# Patient Record
Sex: FEMALE | Race: WHITE | Employment: OTHER | URBAN - METROPOLITAN AREA
[De-identification: names, ages, dates, MRNs, and addresses within clinical notes are randomized per-mention and may not be internally consistent; named-entity substitution may affect disease eponyms.]

---

## 2018-09-20 ENCOUNTER — OFFICE VISIT (OUTPATIENT)
Dept: CARDIOLOGY CLINIC | Age: 68
End: 2018-09-20

## 2018-09-20 ENCOUNTER — CLINICAL SUPPORT (OUTPATIENT)
Dept: CARDIOLOGY CLINIC | Age: 68
End: 2018-09-20

## 2018-09-20 VITALS
HEIGHT: 64 IN | SYSTOLIC BLOOD PRESSURE: 140 MMHG | OXYGEN SATURATION: 97 % | BODY MASS INDEX: 27.14 KG/M2 | RESPIRATION RATE: 16 BRPM | WEIGHT: 159 LBS | HEART RATE: 66 BPM | DIASTOLIC BLOOD PRESSURE: 80 MMHG

## 2018-09-20 DIAGNOSIS — I35.1 NONRHEUMATIC AORTIC VALVE INSUFFICIENCY: ICD-10-CM

## 2018-09-20 DIAGNOSIS — I49.9 IRREGULAR HEART BEAT: ICD-10-CM

## 2018-09-20 DIAGNOSIS — R00.2 PALPITATIONS: ICD-10-CM

## 2018-09-20 DIAGNOSIS — I10 ESSENTIAL HYPERTENSION: ICD-10-CM

## 2018-09-20 DIAGNOSIS — I35.0 NONRHEUMATIC AORTIC VALVE STENOSIS: Primary | ICD-10-CM

## 2018-09-20 RX ORDER — LISINOPRIL 30 MG/1
30 TABLET ORAL DAILY
COMMUNITY

## 2018-09-20 RX ORDER — FLAXSEED OIL 1000 MG
1 CAPSULE ORAL DAILY
COMMUNITY

## 2018-09-20 RX ORDER — BISMUTH SUBSALICYLATE 262 MG
1 TABLET,CHEWABLE ORAL DAILY
COMMUNITY

## 2018-09-20 RX ORDER — ASPIRIN 81 MG/1
TABLET ORAL DAILY
COMMUNITY
End: 2019-10-03 | Stop reason: ALTCHOICE

## 2018-09-20 RX ORDER — ASCORBIC ACID 500 MG
500 TABLET ORAL DAILY
COMMUNITY

## 2018-09-20 RX ORDER — CHOLECALCIFEROL (VITAMIN D3) 125 MCG
1 CAPSULE ORAL DAILY
COMMUNITY

## 2018-09-20 NOTE — PROGRESS NOTES
[facility]  [phone]    Reason for consult: aortic stenosis  Requesting physician: Dr. Jayna Reece     HPI: Kale Palma, a 80y.o. year-old who presents for evaluation of aortic stenosis. She has a bicuspid aortic valve and recent TTE in  showed mild to mod AI and mild AS  She was asked to come for an evaluation  Discussed bicuspid vavle disease, progression, concerns, treatments, At this time her disease is mild. She has been getting regular TTEs ordered by her PCP   She denies any chest pain   No dyspnea with exertion or PND  No orthopnea   No LE edema  No dizziness or syncope  No activity intolerance  Feeling a lot of skipped beats, reduced caffeine and it has helped a little but still has episodes every 5-10 minutes  Used to drink one cup of coffee daily but now drinks decaffeinated coffee  Trying to avoid chocolate kisses, dark chocolate candy bars  No high risk symptoms occur with her palpitations  Sleeps 8 hours/day   Stress level is low   Stress test ok     Her  is a patient of mine - Janalee Meigs    Assessment/Plan:  1. Mild AS, mild to mod AI by TTE  - will plan to repeat TTE in 1 year at her follow up visit to reassess, discussed treatment options for severe AS if intervention needed in the future, advised her not to lift more than 100 lbs  2. HTN - well controlled on lisinopril  3. Vitamin D - on supplementation   4. PUD - not currently on therapy  5.   Palpitations - will order 2 week loop monitor to assess for arrhythmias, BMP and TFTs in  ok     Echo  at [facility] - LVEF 65%, grade 1 dd, biscuspid aortic valve with mild to mod AI and mild AS (RAJEEV 1.6cm2, 16 peak and 8 mean gradient), trace MR, trace TR    Fam Hx: father passed from cancer, mother passed from Alzheimer's, brother passed from brain aneurysm from HTN around age 61  -niece  at age 36 from aortic aneurysm   Soc Hx: no tobacco use, no etoh use, no drug use     She has a past medical history of Essential hypertension; Murmur; and Valvular heart disease. Cardiovascular ROS: no chest pain or dyspnea on exertion, positive for palpitations  Respiratory ROS: no cough, shortness of breath, or wheezing  Neurological ROS: no TIA or stroke symptoms  All other systems negative except as above. PE  Vitals:    09/20/18 1025   BP: 140/80   Pulse: 66   Resp: 16   SpO2: 97%   Weight: 159 lb (72.1 kg)   Height: 5' 4\" (1.626 m)    Body mass index is 27.29 kg/(m^2).    General appearance - alert, well appearing, and in no distress  Mental status - affect appropriate to mood  Eyes - sclera anicteric, moist mucous membranes  Neck - supple, bilateral carotid bruits   Lymphatics - not assessed  Chest - clear to auscultation, no wheezes, rales or rhonchi  Heart - normal rate, regular rhythm, normal S1, S2, 2/6 KAMLA which radiates to the carotids   Abdomen - soft, nontender, nondistended, no masses or organomegaly  Back exam - full range of motion, no tenderness  Neurological - cranial nerves II through XII grossly intact, no focal deficit  Musculoskeletal - no muscular tenderness noted, normal strength  Extremities - peripheral pulses normal, no pedal edema  Skin - normal coloration  no rashes    12 lead ECG: NSR with non-specific ST-T wave changes    Recent Labs:  No results found for: CHOL, CHOLX, CHLST, CHOLV, 693944, HDL, LDL, LDLC, DLDLP, TGLX, TRIGL, TRIGP, CHHD, CHHDX  No results found for: KRYSTEN, ACREA, CREA, REFC3, REFC4  No results found for: BUN, IBUN, MBUNV, BUNV  No results found for: K, KI, PLK, WBK  No results found for: HBA1C, HGBE8, [id number], DWS0MRXS  No results found for: HGB, HGBP, HGBEXT, HGBEXT  No results found for: PLT, PLTEXT, PLTEXT    Reviewed:  Past Medical History:   Diagnosis Date    Essential hypertension     Murmur     Valvular heart disease      History   Smoking Status    Never Smoker   Smokeless Tobacco    Never Used     History   Alcohol Use No Allergies   Allergen Reactions    Sulfacetamide Swelling       Current Outpatient Prescriptions   Medication Sig    lisinopril (PRINIVIL, ZESTRIL) 20 mg tablet Take  by mouth daily.  aspirin delayed-release 81 mg tablet Take  by mouth daily.  cholecalciferol, vitamin D3, (VITAMIN D3) 2,000 unit tab Take 1 Tab by mouth daily.  CALCIUM-MAGNESIUM PO Take 1 Tab by mouth daily.  ascorbic acid, vitamin C, (VITAMIN C) 500 mg tablet Take 500 mg by mouth daily.  flaxseed oil 1,000 mg cap Take 1 Cap by mouth daily.  multivitamin (ONE A DAY) tablet Take 1 Tab by mouth daily. No current facility-administered medications for this visit.         Elaine Maciel MD  [facility] [address] [facility] [address] [facility] [address]  [place] Holguin  [phone]

## 2018-09-20 NOTE — MR AVS SNAPSHOT
[address] [facility] [address] [place] 
[phone] Patient: Allyssa Kelley MRN: [id number] TTZ:[birth year] Visit Information Date & Time Provider Department Dept. Phone Encounter #  
 9/20/2018 10:40 AM Aditya Vega MD [facility] [phone] [id number] Your Appointments 9/20/2019  9:00 AM  
ECHO CARDIOGRAMS 2D with ECHO, [facility] ([facility]ERT[facility] SCHEDULING) Appt Note: echo for AS and 1 yr f/u after  
 [address] [facility] [address] [facility] [address]  
[phone]  
  
   
 [address] [place] [address] Dr [place]  
  
    
 9/20/2019  9:40 AM  
ESTABLISHED PATIENT with Aditya Vega MD  
[facility] Appt Note: echo for AS and 1 yr f/u after  
 [address] [facility] [address] [place]  
[address] [facility] [address] [id number] Upcoming Health Maintenance Date Due Hepatitis C Screening 1950 DTaP/Tdap/Td series (1 - Tdap) 1/12/1972 BREAST CANCER SCRN MAMMOGRAM 1/12/2001 FOBT Q 1 YEAR AGE 50-75 1/12/2001 ZOSTER VACCINE AGE 60> 11/12/2010 GLAUCOMA SCREENING Q2Y 1/12/2016 Bone Densitometry (Dexa) Screening 1/12/2016 Pneumococcal 65+ Low/Medium Risk (1 of 2 - PCV13) 1/12/2016 Influenza Age 5 to Adult 8/1/2018 Allergies as of 9/20/2018  Review Complete On: 9/20/2018 By: Nabila Morales Severity Noted Reaction Type Reactions Sulfacetamide  09/20/2018    Swelling Current Immunizations  Never Reviewed No immunizations on file. Not reviewed this visit You Were Diagnosed With   
  
 Codes Comments Nonrheumatic aortic valve stenosis    -  Primary ICD-10-CM: I35.0 ICD-9-CM: 424.1 Irregular heart beat     ICD-10-CM: I49.9 ICD-9-CM: 427.9 Palpitations     ICD-10-CM: R00.2 ICD-9-CM: 785.1 Nonrheumatic aortic valve insufficiency     ICD-10-CM: I35.1 ICD-9-CM: 424.1 Essential hypertension     ICD-10-CM: I10 
ICD-9-CM: 401.9 Vitals BP Pulse Resp Height(growth percentile) Weight(growth percentile) SpO2  
 140/80 (BP 1 Location: Left arm, BP Patient Position: Sitting) 66 16 5' 4\" (1.626 m) 159 lb (72.1 kg) 97% BMI Smoking Status 27.29 kg/m2 Never Smoker Vitals History BMI and BSA Data Body Mass Index Body Surface Area  
 27.29 kg/m 2 1.8 m 2 Your Updated Medication List  
  
   
This list is accurate as of 9/20/18 11:47 AM.  Always use your most recent med list.  
  
  
  
  
 aspirin delayed-release 81 mg tablet Take  by mouth daily. CALCIUM-MAGNESIUM PO Take 1 Tab by mouth daily. flaxseed oil 1,000 mg Cap Take 1 Cap by mouth daily. lisinopril 20 mg tablet Commonly known as:  Sánchez Shutters Take  by mouth daily. multivitamin tablet Commonly known as:  ONE A DAY Take 1 Tab by mouth daily. VITAMIN C 500 mg tablet Generic drug:  ascorbic acid (vitamin C) Take 500 mg by mouth daily. VITAMIN D3 2,000 unit Tab Generic drug:  cholecalciferol (vitamin D3) Take 1 Tab by mouth daily. We Performed the Following AMB POC EKG ROUTINE W/ 12 LEADS, INTER & REP [05617 CPT(R)] To-Do List   
 09/20/2018 Cardiac Services:  LOOP MONITOR Patient Instructions Please do not lift more than 100 lbs You have no other exercise restrictions Introducing [facility]! Clarisse Holbrook introduces [employer] patient portal. Now you can access parts of your medical record, email your doctor's office, and request medication refills online. 1. In your internet browser, go to [email or web address] 2. Click on the First Time User? Click Here link in the Sign In box. You will see the New Member Sign Up page. 3. Enter your ZoomT[employer] Access Code exactly as it appears below. You will not need to use this code after youve completed the sign-up process. If you do not sign up before the expiration date, you must request a new code. · ZoomT[employer] Access Code: [email or web address] Expires: 12/19/2018  8:13 AM 
 
4. Enter the last four digits of your Social Security Number (xxxx) and Date of Birth (mm/dd/yyyy) as indicated and click Submit. You will be taken to the next sign-up page. 5. Create a ZoomTilt ID. This will be your ZoomTilt login ID and cannot be changed, so think of one that is secure and easy to remember. 6. Create a ZoomTilt password. You can change your password at any time. 7. Enter your Password Reset Question and Answer. This can be used at a later time if you forget your password. 8. Enter your e-mail address. You will receive e-mail notification when new information is available in [address] 9. Click Sign Up. You can now view and download portions of your medical record. 10. Click the Download Summary menu link to download a portable copy of your medical information. If you have questions, please visit the Frequently Asked Questions section of the [employer] website. Remember, ZoomTilt is NOT to be used for urgent needs. For medical emergencies, dial 911. Now available from your iPhone and Android! Please provide this summary of care documentation to your next provider. If you have any questions after today's visit, please call [phone].

## 2018-10-31 ENCOUNTER — DOCUMENTATION ONLY (OUTPATIENT)
Dept: CARDIOLOGY CLINIC | Age: 68
End: 2018-10-31

## 2018-11-01 NOTE — PROGRESS NOTES
Message left on patient's confidential voice mail informing pt of \"No arrhythmias noted on loop monitor test\"-she was as well instructed to call if she has any questions or further episodes

## 2018-11-09 DIAGNOSIS — I49.9 IRREGULAR HEART BEAT: ICD-10-CM

## 2018-11-09 DIAGNOSIS — I10 ESSENTIAL HYPERTENSION: ICD-10-CM

## 2018-11-09 DIAGNOSIS — I35.0 NONRHEUMATIC AORTIC VALVE STENOSIS: ICD-10-CM

## 2018-11-09 DIAGNOSIS — R00.2 PALPITATIONS: ICD-10-CM

## 2018-11-09 DIAGNOSIS — I35.1 NONRHEUMATIC AORTIC VALVE INSUFFICIENCY: ICD-10-CM

## 2019-10-03 ENCOUNTER — OFFICE VISIT (OUTPATIENT)
Dept: CARDIOLOGY CLINIC | Age: 69
End: 2019-10-03

## 2019-10-03 VITALS
HEIGHT: 64 IN | BODY MASS INDEX: 27.14 KG/M2 | RESPIRATION RATE: 17 BRPM | OXYGEN SATURATION: 98 % | DIASTOLIC BLOOD PRESSURE: 74 MMHG | HEART RATE: 67 BPM | WEIGHT: 159 LBS | SYSTOLIC BLOOD PRESSURE: 132 MMHG

## 2019-10-03 DIAGNOSIS — I35.0 NONRHEUMATIC AORTIC VALVE STENOSIS: ICD-10-CM

## 2019-10-03 DIAGNOSIS — I35.0 AORTIC VALVE STENOSIS, ETIOLOGY OF CARDIAC VALVE DISEASE UNSPECIFIED: Primary | ICD-10-CM

## 2019-10-03 DIAGNOSIS — I49.9 IRREGULAR HEARTBEAT: ICD-10-CM

## 2019-10-03 DIAGNOSIS — I35.1 NONRHEUMATIC AORTIC VALVE INSUFFICIENCY: ICD-10-CM

## 2019-10-03 DIAGNOSIS — R00.1 BRADYCARDIA: ICD-10-CM

## 2019-10-03 DIAGNOSIS — I10 BENIGN ESSENTIAL HYPERTENSION: ICD-10-CM

## 2019-10-03 NOTE — PROGRESS NOTES
[facility]  [phone]    Reason for consult: aortic stenosis  Requesting physician: Dr. Abyb Chu     HPI: Oralia Castro, a 77y.o. year-old who presents for evaluation of aortic stenosis. She has a bicuspid aortic valve and recent TTE  showed mild to mod AI and mild AS  [place] No flutters plapitations, dyspnea, chest pain. Rare flutter but not normally. Energy seems low. Not using caffeine, sleeping well. She denies any chest pain   No dyspnea with exertion or PND  No orthopnea , LE edema  No dizziness or syncope, no activity intolerance  Trying to avoid chocolate kisses, dark chocolate candy bars  No high risk symptoms occur with her palpitations  Sleeps 8 hours/day     Her  is a patient of mine - Arletha Dense  She is seeing Dr. Kathy Garcia. Will ask her to follow-up there on the chronic liver cyst.     Assessment/Plan:  1. Mild AS, mild to mod AI by TTE looks fine, stable for now  2. HTN - well controlled on lisinopril  3. Vitamin D - on supplementation   4. PUD - not currently on therapy  5. Palpitations - no arrhythmia on loop  6. Liver cyst- 9cm on measurement today. 7. Bradycardia- asymptomatic, HR to 40 on loop, no indication for intervention at this time. Echo  Ef 65% bicuspid AoV, mild AI  Echo  at [facility] - LVEF 65%, grade 1 dd, biscuspid aortic valve with mild to mod AI and mild AS (RAJEEV 1.6cm2, 16 peak and 8 mean gradient), trace MR, trace TR    Fam Hx: father passed from cancer, mother passed from Alzheimer's, brother passed from brain aneurysm from HTN around age 61  -niece  at age 36 from aortic aneurysm   Soc Hx: no tobacco use, no etoh use, no drug use     She  has a past medical history of Essential hypertension, Murmur, and Valvular heart disease.     Cardiovascular ROS: no chest pain or dyspnea on exertion, positive for palpitations  Respiratory ROS: no cough, shortness of breath, or wheezing  Neurological ROS: no TIA or stroke symptoms  All other systems negative except as above. PE  Vitals:    10/03/19 1028   BP: 132/74   Pulse: 67   Resp: 17   SpO2: 98%   Weight: 159 lb (72.1 kg)   Height: 5' 4\" (1.626 m)    Body mass index is 27.29 kg/m².    General appearance - alert, well appearing, and in no distress  Mental status - affect appropriate to mood  Eyes - sclera anicteric, moist mucous membranes  Neck - supple, bilateral carotid bruits   Lymphatics - not assessed  Chest - clear to auscultation, no wheezes, rales or rhonchi  Heart - normal rate, regular rhythm, normal S1, S2, 2/6 KAMLA which radiates to the carotids   Abdomen - soft, nontender, nondistended, no masses or organomegaly  Back exam - full range of motion, no tenderness  Neurological - cranial nerves II through XII grossly intact, no focal deficit  Musculoskeletal - no muscular tenderness noted, normal strength  Extremities - peripheral pulses normal, no pedal edema  Skin - normal coloration  no rashes    12 lead ECG: NSR with non-specific ST-T wave changes    Recent Labs:  No results found for: CHOL, CHOLX, CHLST, CHOLV, 670214, HDL, HDLP, LDL, LDLC, DLDLP, TGLX, TRIGL, TRIGP, CHHD, CHHDX  No results found for: KRYSTEN, ACREA, CREA, REFC3, REFC4  No results found for: BUN, IBUN, MBUNV, BUNV  No results found for: K, KI, PLK, WBK  No results found for: HBA1C, HGBE8, JYW0FODC, [id number]  No results found for: HGB, HGBP, HGBEXT, HGBEXT  No results found for: PLT, PLTEXT, PLTEXT    Reviewed:  Past Medical History:   Diagnosis Date    Essential hypertension     Murmur     Valvular heart disease      Social History     Tobacco Use   Smoking Status Never Smoker   Smokeless Tobacco Never Used     Social History     Substance and Sexual Activity   Alcohol Use No     Allergies   Allergen Reactions    Sulfacetamide Swelling       Current Outpatient Medications   Medication Sig    lisinopril (PRINIVIL, ZESTRIL) 30 mg tablet Take 30 mg by mouth daily.  aspirin delayed-release 81 mg tablet Take  by mouth daily.  cholecalciferol, vitamin D3, (VITAMIN D3) 2,000 unit tab Take 1 Tab by mouth daily.  CALCIUM-MAGNESIUM PO Take 1 Tab by mouth daily.  ascorbic acid, vitamin C, (VITAMIN C) 500 mg tablet Take 500 mg by mouth daily.  flaxseed oil 1,000 mg cap Take 1 Cap by mouth daily.  multivitamin (ONE A DAY) tablet Take 1 Tab by mouth daily. No current facility-administered medications for this visit.         Michell Dent MD  [facility] [address] [place] [address] [address] [facility] [address]  [facility]  [phone]

## 2020-10-07 ENCOUNTER — OFFICE VISIT (OUTPATIENT)
Dept: CARDIOLOGY CLINIC | Age: 70
End: 2020-10-07
Payer: MEDICARE

## 2020-10-07 ENCOUNTER — ANCILLARY PROCEDURE (OUTPATIENT)
Dept: CARDIOLOGY CLINIC | Age: 70
End: 2020-10-07
Payer: MEDICARE

## 2020-10-07 VITALS
RESPIRATION RATE: 14 BRPM | WEIGHT: 160 LBS | HEART RATE: 67 BPM | DIASTOLIC BLOOD PRESSURE: 84 MMHG | SYSTOLIC BLOOD PRESSURE: 120 MMHG | HEIGHT: 64 IN | BODY MASS INDEX: 27.31 KG/M2 | OXYGEN SATURATION: 98 %

## 2020-10-07 VITALS — HEIGHT: 64 IN | WEIGHT: 160 LBS | BODY MASS INDEX: 27.31 KG/M2

## 2020-10-07 DIAGNOSIS — R00.2 PALPITATIONS: ICD-10-CM

## 2020-10-07 DIAGNOSIS — I35.0 AORTIC VALVE STENOSIS, ETIOLOGY OF CARDIAC VALVE DISEASE UNSPECIFIED: ICD-10-CM

## 2020-10-07 DIAGNOSIS — I10 BENIGN ESSENTIAL HYPERTENSION: ICD-10-CM

## 2020-10-07 DIAGNOSIS — Q23.1 BICUSPID AORTIC VALVE: ICD-10-CM

## 2020-10-07 DIAGNOSIS — I35.0 NONRHEUMATIC AORTIC VALVE STENOSIS: ICD-10-CM

## 2020-10-07 DIAGNOSIS — I35.0 NONRHEUMATIC AORTIC VALVE STENOSIS: Primary | ICD-10-CM

## 2020-10-07 DIAGNOSIS — R00.1 BRADYCARDIA: ICD-10-CM

## 2020-10-07 DIAGNOSIS — I49.9 IRREGULAR HEARTBEAT: ICD-10-CM

## 2020-10-07 LAB
ECHO AO ASC DIAM: 3.43 CM
ECHO AO ROOT DIAM: 3.98 CM
ECHO AR MAX VEL PISA: 362.46 CM/S
ECHO AV AREA PEAK VELOCITY: 2.07 CM2
ECHO AV AREA PLAN: 2.58 CM2
ECHO AV AREA VTI: 1.92 CM2
ECHO AV AREA/BSA PEAK VELOCITY: 1.2 CM2/M2
ECHO AV AREA/BSA VTI: 1.1 CM2/M2
ECHO AV MEAN GRADIENT: 8.14 MMHG
ECHO AV PEAK GRADIENT: 13.68 MMHG
ECHO AV PEAK VELOCITY: 184.94 CM/S
ECHO AV REGURGITANT PHT: 0.59 S
ECHO AV VTI: 34.95 CM
ECHO IVC PROX: 1.44 CM
ECHO LA AREA 4C: 17.65 CM2
ECHO LA MAJOR AXIS: 3.23 CM
ECHO LA MINOR AXIS: 1.82 CM
ECHO LA VOL 2C: 49.9 ML (ref 22–52)
ECHO LA VOL 4C: 41.44 ML (ref 22–52)
ECHO LA VOL BP: 53.93 ML (ref 22–52)
ECHO LA VOL/BSA BIPLANE: 30.31 ML/M2 (ref 16–28)
ECHO LA VOLUME INDEX A2C: 28.05 ML/M2 (ref 16–28)
ECHO LA VOLUME INDEX A4C: 23.29 ML/M2 (ref 16–28)
ECHO LV E' LATERAL VELOCITY: 6.03 CM/S
ECHO LV E' SEPTAL VELOCITY: 4.25 CM/S
ECHO LV EDV A2C: 97.63 ML
ECHO LV EDV A4C: 91.31 ML
ECHO LV EDV BP: 94.33 ML (ref 56–104)
ECHO LV EDV INDEX A4C: 51.3 ML/M2
ECHO LV EDV INDEX BP: 53 ML/M2
ECHO LV EDV NDEX A2C: 54.9 ML/M2
ECHO LV EJECTION FRACTION A2C: 52 PERCENT
ECHO LV EJECTION FRACTION A4C: 51 PERCENT
ECHO LV EJECTION FRACTION BIPLANE: 50.5 PERCENT (ref 55–100)
ECHO LV ESV A2C: 46.52 ML
ECHO LV ESV A4C: 44.95 ML
ECHO LV ESV BP: 46.71 ML (ref 19–49)
ECHO LV ESV INDEX A2C: 26.1 ML/M2
ECHO LV ESV INDEX A4C: 25.3 ML/M2
ECHO LV ESV INDEX BP: 26.3 ML/M2
ECHO LV INTERNAL DIMENSION DIASTOLIC: 3.93 CM (ref 3.9–5.3)
ECHO LV INTERNAL DIMENSION SYSTOLIC: 2.84 CM
ECHO LV IVSD: 1.08 CM (ref 0.6–0.9)
ECHO LV MASS 2D: 131.6 G (ref 67–162)
ECHO LV MASS INDEX 2D: 74 G/M2 (ref 43–95)
ECHO LV POSTERIOR WALL DIASTOLIC: 1.01 CM (ref 0.6–0.9)
ECHO LVOT DIAM: 2.11 CM
ECHO LVOT PEAK GRADIENT: 4.79 MMHG
ECHO LVOT PEAK VELOCITY: 109.42 CM/S
ECHO LVOT SV: 67 ML
ECHO LVOT VTI: 19.16 CM
ECHO MV A VELOCITY: 93.9 CM/S
ECHO MV E DECELERATION TIME (DT): 0.34 S
ECHO MV E VELOCITY: 74.29 CM/S
ECHO MV E/A RATIO: 0.79
ECHO MV E/E' LATERAL: 12.32
ECHO MV E/E' RATIO (AVERAGED): 14.9
ECHO MV E/E' SEPTAL: 17.48
ECHO PV MAX VELOCITY: 78.72 CM/S
ECHO PV PEAK INSTANTANEOUS GRADIENT SYSTOLIC: 2.48 MMHG
ECHO RV TAPSE: 2.08 CM (ref 1.5–2)
ECHO TV REGURGITANT MAX VELOCITY: 239.21 CM/S
ECHO TV REGURGITANT PEAK GRADIENT: 22.89 MMHG
LA VOL DISK BP: 47.72 ML (ref 22–52)

## 2020-10-07 PROCEDURE — G8432 DEP SCR NOT DOC, RNG: HCPCS | Performed by: INTERNAL MEDICINE

## 2020-10-07 PROCEDURE — 93306 TTE W/DOPPLER COMPLETE: CPT | Performed by: INTERNAL MEDICINE

## 2020-10-07 PROCEDURE — G8400 PT W/DXA NO RESULTS DOC: HCPCS | Performed by: INTERNAL MEDICINE

## 2020-10-07 PROCEDURE — G8419 CALC BMI OUT NRM PARAM NOF/U: HCPCS | Performed by: INTERNAL MEDICINE

## 2020-10-07 PROCEDURE — 99214 OFFICE O/P EST MOD 30 MIN: CPT | Performed by: INTERNAL MEDICINE

## 2020-10-07 PROCEDURE — G8427 DOCREV CUR MEDS BY ELIG CLIN: HCPCS | Performed by: INTERNAL MEDICINE

## 2020-10-07 PROCEDURE — G8536 NO DOC ELDER MAL SCRN: HCPCS | Performed by: INTERNAL MEDICINE

## 2020-10-07 PROCEDURE — 1090F PRES/ABSN URINE INCON ASSESS: CPT | Performed by: INTERNAL MEDICINE

## 2020-10-07 PROCEDURE — 1101F PT FALLS ASSESS-DOCD LE1/YR: CPT | Performed by: INTERNAL MEDICINE

## 2020-10-07 PROCEDURE — 3017F COLORECTAL CA SCREEN DOC REV: CPT | Performed by: INTERNAL MEDICINE

## 2020-10-07 PROCEDURE — 93000 ELECTROCARDIOGRAM COMPLETE: CPT | Performed by: INTERNAL MEDICINE

## 2020-10-07 NOTE — PROGRESS NOTES
[facility] [facility]  [phone]    Reason for consult: aortic stenosis  Requesting physician: Dr. Bethany Almendarez     HPI: Fannie Simon, a 72y.o. year-old who presents for evaluation of aortic stenosis. She has a bicuspid aortic valve with mild to mod AI and mild AS  Minimal change on echo today    No flutters plapitations, dyspnea, chest pain. Rare flutter but not normally. Not using caffeine, sleeping well. She denies any chest pain   No dyspnea with exertion or PND  No orthopnea , LE edema  No dizziness or syncope, no activity intolerance  Trying to avoid chocolate kisses, dark chocolate candy bars  No high risk symptoms occur with her palpitations  Sleeps 8 hours/day     Her  is a patient of mine - Cy Silvamarcellus  She is seeing Dr. Bolivar Khan. Will ask her to follow-up there on the chronic liver cyst.     She is walking 2-3miles a day with the new puppy. Will get labs from Dr. Raleigh Madsen, last month. She has not heard back on the results yet either      Assessment/Plan:  1. Bicuspid AoV, Mild AS, mild to mod AI by TTE looks fine, stable for now  2. HTN - well controlled on lisinopril  3. Vitamin D - on supplementation   4. PUD - not currently on therapy  5. Palpitations - no arrhythmia on loop  6. Liver cyst- 9cm on measurement today. 7. Bradycardia- asymptomatic, HR to 40 on loop, no indication for intervention at this time.  NO sx of progression, discussed SSS and when to worry/call    Echo  Ef 65% bicuspid AoV, mild AI  Echo  at [facility] - LVEF 65%, grade 1 dd, biscuspid aortic valve with mild to mod AI and mild AS (RAJEEV 1.6cm2, 16 peak and 8 mean gradient), trace MR, trace TR    Fam Hx: father passed from cancer, mother passed from Alzheimer's, brother passed from brain aneurysm from HTN around age 61  -niece  at age 36 from aortic aneurysm   Soc Hx: no tobacco use, no etoh use, no drug use     She  has a past medical history of Essential hypertension, Murmur, and Valvular heart disease. Cardiovascular ROS: no chest pain or dyspnea on exertion, positive for palpitations  Respiratory ROS: no cough, shortness of breath, or wheezing  Neurological ROS: no TIA or stroke symptoms  All other systems negative except as above. PE  Vitals:    10/07/20 1040   BP: 120/84   Pulse: 67   Resp: 14   SpO2: 98%   Weight: 160 lb (72.6 kg)   Height: 5' 4\" (1.626 m)    Body mass index is 27.46 kg/m².    General appearance - alert, well appearing, and in no distress  Mental status - affect appropriate to mood  Eyes - sclera anicteric, moist mucous membranes  Neck - supple, bilateral carotid bruits   Lymphatics - not assessed  Chest - clear to auscultation, no wheezes, rales or rhonchi  Heart - normal rate, regular rhythm, normal S1, S2, 2/6 KAMLA which radiates to the carotids   Abdomen - soft, nontender, nondistended, no masses or organomegaly  Back exam - full range of motion, no tenderness  Neurological - cranial nerves II through XII grossly intact, no focal deficit  Musculoskeletal - no muscular tenderness noted, normal strength  Extremities - peripheral pulses normal, no pedal edema  Skin - normal coloration  no rashes    12 lead ECG: NSR with non-specific ST-T wave changes    Recent Labs:  No results found for: CHOL, CHOLX, CHLST, CHOLV, [id number], HDL, HDLP, LDL, LDLC, DLDLP, TGLX, TRIGL, TRIGP, CHHD, CHHDX  No results found for: KRYSTEN, [address] [facility], CREA, REFC3, REFC4  No results found for: BUN, IBUN, MBUNV, BUNV  No results found for: K, KI, PLK, WBK  No results found for: HBA1C, HGBE8, NTA3CDDR, VZL2WPDI  No results found for: HGB, HGBP, HGBEXT, HGBEXT  No results found for: PLT, PLTEXT, PLTEXT    Reviewed:  Past Medical History:   Diagnosis Date    Essential hypertension     Murmur     Valvular heart disease      Social History     Tobacco Use   Smoking Status Never Smoker   Smokeless Tobacco Never Used     Social History     Substance and Sexual Activity Alcohol Use No     Allergies   Allergen Reactions    Sulfacetamide Swelling       Current Outpatient Medications   Medication Sig    Lactobacillus acidophilus (PROBIOTIC PO) Take 1 Cap by mouth daily.  QUERCETIN DIHYDRATE, BULK, Take 1 Cap by mouth daily.  lisinopril (PRINIVIL, ZESTRIL) 30 mg tablet Take 30 mg by mouth daily.  cholecalciferol, vitamin D3, (VITAMIN D3) 2,000 unit tab Take 1 Tab by mouth daily.  CALCIUM-MAGNESIUM PO Take 1 Tab by mouth daily.  ascorbic acid, vitamin C, (VITAMIN C) 500 mg tablet Take 500 mg by mouth daily.  flaxseed oil 1,000 mg cap Take 1 Cap by mouth daily.  multivitamin (ONE A DAY) tablet Take 1 Tab by mouth daily. No current facility-administered medications for this visit.         Shon Godoy MD  [facility] [address] [facility] [address] [place] [address]  [place] [address] [address] [facility]  [phone]

## 2021-08-02 ENCOUNTER — TELEPHONE (OUTPATIENT)
Dept: CARDIOLOGY CLINIC | Age: 71
End: 2021-08-02

## 2021-08-02 NOTE — TELEPHONE ENCOUNTER
8/2/2021 at 1:05 left message to advise echo & same day with Dr. Joaquin Mckeon on 10/12/2021 have been cancelled - requested return call to discuss reschedule options

## 2023-05-26 RX ORDER — ASCORBIC ACID 500 MG
500 TABLET ORAL DAILY
COMMUNITY

## 2023-05-26 RX ORDER — LISINOPRIL 30 MG/1
30 TABLET ORAL DAILY
COMMUNITY

## 2023-05-26 RX ORDER — DIMENHYDRINATE 50 MG
1 TABLET ORAL DAILY
COMMUNITY